# Patient Record
Sex: MALE | Race: OTHER | Employment: FULL TIME | URBAN - METROPOLITAN AREA
[De-identification: names, ages, dates, MRNs, and addresses within clinical notes are randomized per-mention and may not be internally consistent; named-entity substitution may affect disease eponyms.]

---

## 2017-01-19 ENCOUNTER — OFFICE VISIT (OUTPATIENT)
Dept: SURGERY | Facility: CLINIC | Age: 42
End: 2017-01-19

## 2017-01-19 VITALS
HEART RATE: 64 BPM | BODY MASS INDEX: 20.59 KG/M2 | SYSTOLIC BLOOD PRESSURE: 98 MMHG | DIASTOLIC BLOOD PRESSURE: 64 MMHG | HEIGHT: 72 IN | RESPIRATION RATE: 16 BRPM | WEIGHT: 152 LBS | TEMPERATURE: 98 F

## 2017-01-19 DIAGNOSIS — K60.1 CHRONIC ANAL FISSURE: Primary | ICD-10-CM

## 2017-01-19 PROCEDURE — 99024 POSTOP FOLLOW-UP VISIT: CPT | Performed by: COLON & RECTAL SURGERY

## 2017-01-20 NOTE — PROGRESS NOTES
Post Operative Visit Note       Active Problems  1. Chronic anal fissure         Chief Complaint   Post-Op     History of Present Illness     This patient is recovering well from his cutaneous lateral internal sphincterotomy.   The procedure was performed o hearing loss, nosebleeds, sore throat and trouble swallowing. Respiratory: Negative for apnea, cough, shortness of breath and wheezing. Cardiovascular: Negative for chest pain, palpitations and leg swelling.    Gastrointestinal: Negative for nausea, v have no further follow-up scheduled with this patient at this time. This patient can see me on an as-needed basis. This patient should return urgently for any problems or complications related to my surgical intervention.            No orders of the defin

## 2017-01-20 NOTE — PATIENT INSTRUCTIONS
This patient is recovering well from his cutaneous lateral internal sphincterotomy. The procedure was performed on December 16, 2016. This patient is doing extremely well.     The patient has no nausea, vomiting, diarrhea, abdominal pain, or constipatio

## 2017-03-22 ENCOUNTER — HOSPITAL ENCOUNTER (OUTPATIENT)
Age: 42
Discharge: HOME | End: 2017-03-22
Attending: FAMILY MEDICINE
Payer: MEDICAID

## 2017-03-22 VITALS
OXYGEN SATURATION: 100 % | SYSTOLIC BLOOD PRESSURE: 116 MMHG | HEART RATE: 53 BPM | RESPIRATION RATE: 16 BRPM | DIASTOLIC BLOOD PRESSURE: 74 MMHG | TEMPERATURE: 98 F

## 2017-03-22 DIAGNOSIS — R53.83 OTHER FATIGUE: Primary | ICD-10-CM

## 2017-03-22 LAB
#LYMPHOCYTE IC: 1.6 X10ˆ3/UL (ref 0.9–3.2)
#MXD IC: 0.4 X10ˆ3/UL (ref 0.1–1)
#NEUTROPHIL IC: 2 X10ˆ3/UL (ref 1.3–6.7)
CREAT SERPL-MCNC: 0.6 MG/DL (ref 0.7–1.2)
GLUCOSE BLD-MCNC: 78 MG/DL (ref 65–99)
HCT IC: 39.2 % (ref 37–54)
HGB IC: 13.8 G/DL (ref 13–17)
ISTAT BLOOD GAS TCO2: 26 MMOL/L (ref 22–32)
ISTAT BUN: 7 MG/DL (ref 8–20)
ISTAT CHLORIDE: 99 MMOL/L (ref 101–111)
ISTAT HEMATOCRIT: 42 % (ref 37–54)
ISTAT IONIZED CALCIUM: 1.15 MMOL/L (ref 1.12–1.32)
ISTAT POTASSIUM: 3.7 MMOL/L (ref 3.6–5.1)
ISTAT SODIUM: 137 MMOL/L (ref 136–144)
LYMPHOCYTES NFR BLD AUTO: 40.4 %
MCH IC: 30.3 PG (ref 27–33.2)
MCHC IC: 35.2 G/DL (ref 31–37)
MCV IC: 86 FL (ref 80–99)
MIXED CELL %: 11 %
NEUTROPHILS NFR BLD AUTO: 48.6 %
PLT IC: 189 X10ˆ3/UL (ref 150–450)
RBC IC: 4.56 X10ˆ6/UL (ref 4.3–5.7)
TSI SER-ACNC: 1.62 MIU/ML (ref 0.35–5.5)
WBC IC: 4 X10ˆ3/UL (ref 4–13)

## 2017-03-22 PROCEDURE — 99203 OFFICE O/P NEW LOW 30 MIN: CPT

## 2017-03-22 PROCEDURE — 85025 COMPLETE CBC W/AUTO DIFF WBC: CPT | Performed by: FAMILY MEDICINE

## 2017-03-22 PROCEDURE — 99213 OFFICE O/P EST LOW 20 MIN: CPT

## 2017-03-22 PROCEDURE — 84443 ASSAY THYROID STIM HORMONE: CPT | Performed by: FAMILY MEDICINE

## 2017-03-22 PROCEDURE — 80047 BASIC METABLC PNL IONIZED CA: CPT

## 2017-03-23 NOTE — ED PROVIDER NOTES
Patient Seen in: [facility]    History   Patient presents with:  Fatigue    Stated Complaint: tired ,  hair thining     HPI    41-year-old male presents for feeling tired, fatigued, had thinning and weight loss.   Patient st 1903 97.8 °F (36.6 °C)   Temp src 03/22/17 1903 Oral   SpO2 03/22/17 1903 100 %   O2 Device 03/22/17 1903 None (Room air)       Current:/74 mmHg  Pulse 53  Temp(Src) 97.8 °F (36.6 °C) (Oral)  Resp 16  SpO2 100%        Physical Exam   Constitutional:

## 2017-03-23 NOTE — ED INITIAL ASSESSMENT (HPI)
Pt c/o increase fatigue, constipation, hair loss and loss of weight for the last couple of months. Pt is concerned that he may have hypothyroid.

## 2017-03-24 ENCOUNTER — OFFICE VISIT (OUTPATIENT)
Dept: INTERNAL MEDICINE CLINIC | Facility: CLINIC | Age: 42
End: 2017-03-24

## 2017-03-24 VITALS
SYSTOLIC BLOOD PRESSURE: 122 MMHG | BODY MASS INDEX: 21.84 KG/M2 | WEIGHT: 161.25 LBS | HEART RATE: 67 BPM | RESPIRATION RATE: 16 BRPM | OXYGEN SATURATION: 98 % | TEMPERATURE: 98 F | DIASTOLIC BLOOD PRESSURE: 70 MMHG | HEIGHT: 72 IN

## 2017-03-24 DIAGNOSIS — Z00.00 LABORATORY EXAMINATION ORDERED AS PART OF A ROUTINE GENERAL MEDICAL EXAMINATION: ICD-10-CM

## 2017-03-24 DIAGNOSIS — K60.1 CHRONIC ANAL FISSURE: Primary | ICD-10-CM

## 2017-03-24 PROCEDURE — 99203 OFFICE O/P NEW LOW 30 MIN: CPT | Performed by: INTERNAL MEDICINE

## 2017-03-24 NOTE — PROGRESS NOTES
Ladonna Vang  2/3/1975 is a 36year old male. Patient presents with:  Establish Care  Follow - Up   currently has no complaint. Establishing himself as a primary care.   Patient had chronic anal fissure and does see Dr. Rolanda Messina on a regu wheezing/rhonchi/rales. Breath sounds bilaterally: symmetrical.   ABDOMEN:   Bowel sounds: normal.   General: normal.   Guarding: absent. Hernia: absent. Kidneys: normal.   Liver, Spleen: no hepatosplenomegaly (HSM). Rebound tenderness: absent.

## 2017-03-24 NOTE — PATIENT INSTRUCTIONS
Patient currently scheduled to have a colonoscopy with Dr. Niyah Troy.   See me for complete physical

## 2017-03-27 ENCOUNTER — APPOINTMENT (OUTPATIENT)
Dept: LAB | Age: 42
End: 2017-03-27
Attending: INTERNAL MEDICINE
Payer: MEDICAID

## 2017-03-27 DIAGNOSIS — Z00.00 LABORATORY EXAMINATION ORDERED AS PART OF A ROUTINE GENERAL MEDICAL EXAMINATION: ICD-10-CM

## 2017-03-27 LAB
ALBUMIN SERPL-MCNC: 3.9 G/DL (ref 3.5–4.8)
ALP LIVER SERPL-CCNC: 71 U/L (ref 45–117)
ALT SERPL-CCNC: 36 U/L (ref 17–63)
AST SERPL-CCNC: 25 U/L (ref 15–41)
BILIRUB SERPL-MCNC: 0.4 MG/DL (ref 0.1–2)
BILIRUB UR QL STRIP.AUTO: NEGATIVE
BUN BLD-MCNC: 4 MG/DL (ref 8–20)
CALCIUM BLD-MCNC: 9.4 MG/DL (ref 8.3–10.3)
CHLORIDE: 104 MMOL/L (ref 101–111)
CHOLEST SMN-MCNC: 155 MG/DL (ref ?–200)
CLARITY UR REFRACT.AUTO: CLEAR
CO2: 27 MMOL/L (ref 22–32)
COLOR UR AUTO: COLORLESS
CREAT BLD-MCNC: 0.67 MG/DL (ref 0.7–1.3)
EST. AVERAGE GLUCOSE BLD GHB EST-MCNC: 114 MG/DL (ref 68–126)
GLUCOSE BLD-MCNC: 74 MG/DL (ref 70–99)
GLUCOSE UR STRIP.AUTO-MCNC: NEGATIVE MG/DL
HBA1C MFR BLD HPLC: 5.6 % (ref ?–5.7)
HDLC SERPL-MCNC: 48 MG/DL (ref 45–?)
HDLC SERPL: 3.23 {RATIO} (ref ?–4.97)
KETONES UR STRIP.AUTO-MCNC: NEGATIVE MG/DL
LDLC SERPL CALC-MCNC: 99 MG/DL (ref ?–130)
LEUKOCYTE ESTERASE UR QL STRIP.AUTO: NEGATIVE
M PROTEIN MFR SERPL ELPH: 7.5 G/DL (ref 6.1–8.3)
NITRITE UR QL STRIP.AUTO: NEGATIVE
NONHDLC SERPL-MCNC: 107 MG/DL (ref ?–130)
PH UR STRIP.AUTO: 8 [PH] (ref 4.5–8)
POTASSIUM SERPL-SCNC: 4 MMOL/L (ref 3.6–5.1)
PROT UR STRIP.AUTO-MCNC: NEGATIVE MG/DL
RBC UR QL AUTO: NEGATIVE
SODIUM SERPL-SCNC: 137 MMOL/L (ref 136–144)
SP GR UR STRIP.AUTO: <1.005 (ref 1–1.03)
TRIGLYCERIDES: 42 MG/DL (ref ?–150)
UROBILINOGEN UR STRIP.AUTO-MCNC: <2 MG/DL
VLDL: 8 MG/DL (ref 5–40)

## 2017-03-27 PROCEDURE — 80053 COMPREHEN METABOLIC PANEL: CPT

## 2017-03-27 PROCEDURE — 83036 HEMOGLOBIN GLYCOSYLATED A1C: CPT

## 2017-03-27 PROCEDURE — 80061 LIPID PANEL: CPT

## 2017-03-27 PROCEDURE — 36415 COLL VENOUS BLD VENIPUNCTURE: CPT

## 2017-03-27 PROCEDURE — 81003 URINALYSIS AUTO W/O SCOPE: CPT

## 2017-03-28 ENCOUNTER — OFFICE VISIT (OUTPATIENT)
Dept: SURGERY | Facility: CLINIC | Age: 42
End: 2017-03-28

## 2017-03-28 VITALS — HEIGHT: 72 IN | BODY MASS INDEX: 22.35 KG/M2 | WEIGHT: 165 LBS

## 2017-03-28 DIAGNOSIS — K60.1 CHRONIC ANAL FISSURE: ICD-10-CM

## 2017-03-28 DIAGNOSIS — R10.9 ABDOMINAL CRAMPS: ICD-10-CM

## 2017-03-28 DIAGNOSIS — R63.4 WEIGHT LOSS OF MORE THAN 10% BODY WEIGHT: ICD-10-CM

## 2017-03-28 DIAGNOSIS — K92.2 INTESTINAL BLEEDING: Primary | ICD-10-CM

## 2017-03-28 PROCEDURE — 99243 OFF/OP CNSLTJ NEW/EST LOW 30: CPT | Performed by: COLON & RECTAL SURGERY

## 2017-03-28 RX ORDER — POLYETHYLENE GLYCOL 3350, SODIUM CHLORIDE, SODIUM BICARBONATE, POTASSIUM CHLORIDE 420; 11.2; 5.72; 1.48 G/4L; G/4L; G/4L; G/4L
POWDER, FOR SOLUTION ORAL
Qty: 1 BOTTLE | Refills: 0 | Status: SHIPPED | OUTPATIENT
Start: 2017-03-28

## 2017-03-28 NOTE — PROGRESS NOTES
Follow Up Visit Note       Active Problems      1. Intestinal bleeding    2. Abdominal cramps    3. Weight loss of more than 10% body weight    4.  Chronic anal fissure          Chief Complaint   Patient presents with:  Colonoscopy: Had a fissure last year pertinent past medical history. History reviewed. No pertinent past surgical history. The family history and social history have been reviewed by me today. No family history on file.   Social History    Marital Status:              Spouse Name no rales. He exhibits no mass. Abdominal: Soft. Normal appearance, normal aorta and bowel sounds are normal. He exhibits no distension, no fluid wave, no ascites, no pulsatile midline mass and no mass. There is no splenomegaly or hepatomegaly.  There is n within the last 6 months. He has no history of diarrhea. There is nobody in the family with colon cancer, colon polyps, or cancer of the uterus. We are scheduling his colonoscopy for April 4, 2017.            No orders of the defined types were place

## 2017-03-29 PROBLEM — K92.2 INTESTINAL BLEEDING: Status: ACTIVE | Noted: 2017-03-29

## 2017-03-29 PROBLEM — R10.9 ABDOMINAL CRAMPS: Status: ACTIVE | Noted: 2017-03-29

## 2017-03-29 PROBLEM — R63.4 WEIGHT LOSS OF MORE THAN 10% BODY WEIGHT: Status: ACTIVE | Noted: 2017-03-29

## 2017-03-30 ENCOUNTER — OFFICE VISIT (OUTPATIENT)
Dept: INTERNAL MEDICINE CLINIC | Facility: CLINIC | Age: 42
End: 2017-03-30

## 2017-03-30 VITALS
BODY MASS INDEX: 22.29 KG/M2 | TEMPERATURE: 98 F | DIASTOLIC BLOOD PRESSURE: 60 MMHG | HEART RATE: 60 BPM | WEIGHT: 159.25 LBS | HEIGHT: 70.75 IN | RESPIRATION RATE: 15 BRPM | SYSTOLIC BLOOD PRESSURE: 98 MMHG

## 2017-03-30 DIAGNOSIS — Z00.00 ROUTINE GENERAL MEDICAL EXAMINATION AT A HEALTH CARE FACILITY: Primary | ICD-10-CM

## 2017-03-30 PROBLEM — K92.2 INTESTINAL BLEEDING: Status: RESOLVED | Noted: 2017-03-29 | Resolved: 2017-03-30

## 2017-03-30 PROBLEM — R10.9 ABDOMINAL CRAMPS: Status: RESOLVED | Noted: 2017-03-29 | Resolved: 2017-03-30

## 2017-03-30 PROCEDURE — 99396 PREV VISIT EST AGE 40-64: CPT | Performed by: INTERNAL MEDICINE

## 2017-03-30 NOTE — PROGRESS NOTES
Taty Record  2/3/1975 is a 36year old male. Patient presents with:  CPX      HPI:     See below     Current Outpatient Prescriptions:  Polyethylene Glycol 3350 (MIRALAX OR) Take 17 g by mouth 2 (two) times daily.  Disp:  Rfl:    Probiotic Produc any wt loss or gain -no change in appetite noted- Dysphagia none. Scheduled for colonoscopy next week with Dr. Lesly Yates --weight is currently stable   Hematology:   Patient denies abnormal bleeding, easy bruising. Enlarged lymph nodes none.    Men Only Wheezes: no. ABDOMEN:   Bowel sounds: normal.   General: normal.   Hernia: absent. Liver, Spleen: no hepatosplenomegaly (HSM). Tenderness: absent . GENITOURINARY - MALE:   External genitals: normal scrotum,testis and penis.    NICOLE: normal .   Peni

## 2017-03-30 NOTE — PATIENT INSTRUCTIONS
Patient presents with significant issues and problems that warrant colonoscopy. He has blood on his stool. He has been running constipated. He takes MiraLAX daily to avoid constipation. He has abdominal pain and cramps. They are diffuse.   He has a 2

## 2017-04-04 ENCOUNTER — LABORATORY ENCOUNTER (OUTPATIENT)
Dept: LAB | Age: 42
End: 2017-04-04
Attending: COLON & RECTAL SURGERY
Payer: MEDICAID

## 2017-04-04 DIAGNOSIS — K63.5 COLON POLYP: Primary | ICD-10-CM

## 2017-04-04 PROCEDURE — 88305 TISSUE EXAM BY PATHOLOGIST: CPT

## 2017-04-17 ENCOUNTER — TELEPHONE (OUTPATIENT)
Dept: SURGERY | Facility: CLINIC | Age: 42
End: 2017-04-17

## 2017-04-17 NOTE — TELEPHONE ENCOUNTER
Patient had colonoscopy and biopsy on 4/42017 with Dr. Charles Crandall. Notified patient of  results and colonoscopy recall put in system for 10 years---4/2027 per Dr. Charles Crandall.

## (undated) NOTE — MR AVS SNAPSHOT
[place]  [address] [facility] [address]  [address] [facility] [address] [phone]               Thank you for choosing us for your health care visit with Susannah Barth MD.  We are glad to serve you and happy to provide you with this braun 122/70 mmHg 67 98.1 °F (36.7 °C) (Oral) 72\" 161 lb 4 oz 21.86 kg/m2         Current Medications          This list is accurate as of: 3/24/17 12:51 PM.  Always use your most recent med list.                Cj Curly MIX OR   Take by mouth.  One teas

## (undated) NOTE — MR AVS SNAPSHOT
[place]  [address] [facility] [address]  [address] [facility] [address] [phone]               Thank you for choosing us for your health care visit with Alon Langley MD.  We are glad to serve you and happy to provide you with this braun

## (undated) NOTE — MR AVS SNAPSHOT
[facility] [address]  [address] [address] [phone]               Thank you for choosing us for your health care visit with Ora Cowden, MD.  We are glad to serve you and happy to provide you wi abdominal distention. He has his history of constipation that is within the last 6 months. He has no history of diarrhea. There is nobody in the family with colon cancer, colon polyps, or cancer of the uterus.     We are scheduling his colonoscopy for

## (undated) NOTE — MR AVS SNAPSHOT
[facility]  [address], [address] [address] [phone]               Thank you for choosing us for your health care visit with Nara Garza MD.  We are glad to serve you and happy to provide you with this summary of you recent med list.                Alisson Bottoms MIX OR   Take by mouth. One teaspoon 2 times daily           HYDROcodone-acetaminophen 5-325 MG Tabs   Take 1-2 tablets by mouth every 4 (four) hours as needed.    Commonly known as:  Bartolo Styles

## (undated) NOTE — Clinical Note
2017    Patient: Mihir Canela  : 2/3/1975 Visit date: 3/28/2017    Dear  Dr. Jose Alfredo Aleman MD,    Thank you for referring Mihir Canela to my practice. Please find my assessment and plan below.         Assessment   Intestinal bleeding  (prim

## (undated) NOTE — ED AVS SNAPSHOT
[facility] CIARA Bautista    [address] [place] [address]    Phone:  [phone]    Fax:  [phone]           Siria Mancia   MRN: [id number]    Department:  [facility]   Date of Visit Expect to receive an electronic request (by e-mail or text) to complete a self-assessment the day after your visit. You may also receive a call from our patient liason soon after your visit.  Also, some patients receive a detailed feedback survey mailed to [place] [address] [address]  ([address]) [address] [facility] [phone] [address] [facility] [address]. ([address] [place] [address]) [phone] [address] [place] [address] [address] (